# Patient Record
Sex: FEMALE | Race: BLACK OR AFRICAN AMERICAN | Employment: FULL TIME | URBAN - METROPOLITAN AREA
[De-identification: names, ages, dates, MRNs, and addresses within clinical notes are randomized per-mention and may not be internally consistent; named-entity substitution may affect disease eponyms.]

---

## 2018-09-19 ENCOUNTER — HOSPITAL ENCOUNTER (OUTPATIENT)
Dept: MAMMOGRAPHY | Age: 53
Discharge: HOME | End: 2018-09-19
Attending: PHYSICIAN ASSISTANT
Payer: COMMERCIAL

## 2018-09-19 DIAGNOSIS — Z12.39 SCREENING BREAST EXAMINATION: ICD-10-CM

## 2018-09-19 PROCEDURE — 77063 BREAST TOMOSYNTHESIS BI: CPT

## 2019-09-20 ENCOUNTER — HOSPITAL ENCOUNTER (OUTPATIENT)
Dept: MAMMOGRAPHY | Age: 54
Discharge: HOME | End: 2019-09-20
Attending: PHYSICIAN ASSISTANT
Payer: COMMERCIAL

## 2019-09-20 DIAGNOSIS — Z12.39 BREAST SCREENING, UNSPECIFIED: ICD-10-CM

## 2019-09-20 PROCEDURE — 77063 BREAST TOMOSYNTHESIS BI: CPT

## 2019-09-25 ENCOUNTER — HOSPITAL ENCOUNTER (OUTPATIENT)
Dept: MAMMOGRAPHY | Age: 54
Discharge: HOME | End: 2019-09-25
Attending: PHYSICIAN ASSISTANT
Payer: COMMERCIAL

## 2019-09-25 DIAGNOSIS — R92.8 ABNORMAL MAMMOGRAM: ICD-10-CM

## 2019-09-25 PROCEDURE — 76642 ULTRASOUND BREAST LIMITED: CPT

## 2020-10-06 ENCOUNTER — HOSPITAL ENCOUNTER (OUTPATIENT)
Dept: MAMMOGRAPHY | Age: 55
Discharge: HOME | End: 2020-10-06
Attending: FAMILY MEDICINE
Payer: COMMERCIAL

## 2020-10-06 DIAGNOSIS — Z12.31 VISIT FOR SCREENING MAMMOGRAM: ICD-10-CM

## 2020-10-06 PROCEDURE — 77063 BREAST TOMOSYNTHESIS BI: CPT

## 2021-06-24 ENCOUNTER — TRANSCRIBE ORDER (OUTPATIENT)
Dept: SCHEDULING | Age: 56
End: 2021-06-24

## 2021-06-24 DIAGNOSIS — R11.0 NAUSEA: ICD-10-CM

## 2021-06-24 DIAGNOSIS — R10.13 EPIGASTRIC PAIN: Primary | ICD-10-CM

## 2021-06-25 ENCOUNTER — HOSPITAL ENCOUNTER (OUTPATIENT)
Dept: ULTRASOUND IMAGING | Age: 56
Discharge: HOME | End: 2021-06-25
Attending: PHYSICIAN ASSISTANT
Payer: COMMERCIAL

## 2021-06-25 DIAGNOSIS — R11.0 NAUSEA: ICD-10-CM

## 2021-06-25 DIAGNOSIS — R10.13 EPIGASTRIC PAIN: ICD-10-CM

## 2021-06-25 PROCEDURE — 76700 US EXAM ABDOM COMPLETE: CPT

## 2021-09-24 ENCOUNTER — TRANSCRIBE ORDER (OUTPATIENT)
Dept: SCHEDULING | Age: 56
End: 2021-09-24

## 2021-09-24 DIAGNOSIS — Z01.419 PAP SMEAR, LOW-RISK: Primary | ICD-10-CM

## 2021-10-29 ENCOUNTER — HOSPITAL ENCOUNTER (OUTPATIENT)
Dept: MAMMOGRAPHY | Age: 56
Discharge: HOME | End: 2021-10-29
Attending: PHYSICIAN ASSISTANT
Payer: COMMERCIAL

## 2021-10-29 DIAGNOSIS — Z01.419 PAP SMEAR, LOW-RISK: ICD-10-CM

## 2021-10-29 PROCEDURE — 77067 SCR MAMMO BI INCL CAD: CPT

## 2022-10-06 ENCOUNTER — TRANSCRIBE ORDER (OUTPATIENT)
Dept: SCHEDULING | Age: 57
End: 2022-10-06

## 2022-10-06 DIAGNOSIS — Z12.31 VISIT FOR SCREENING MAMMOGRAM: Primary | ICD-10-CM

## 2022-11-01 ENCOUNTER — HOSPITAL ENCOUNTER (OUTPATIENT)
Dept: MAMMOGRAPHY | Age: 57
Discharge: HOME | End: 2022-11-01
Attending: PHYSICIAN ASSISTANT
Payer: COMMERCIAL

## 2022-11-01 DIAGNOSIS — Z12.31 VISIT FOR SCREENING MAMMOGRAM: ICD-10-CM

## 2022-11-01 PROCEDURE — 77067 SCR MAMMO BI INCL CAD: CPT

## 2022-11-01 PROCEDURE — 77063 BREAST TOMOSYNTHESIS BI: CPT

## 2023-08-29 ENCOUNTER — HOSPITAL ENCOUNTER (OUTPATIENT)
Facility: HOSPITAL | Age: 58
Setting detail: RECURRING SERIES
Discharge: HOME | End: 2023-09-01
Payer: COMMERCIAL

## 2023-08-29 PROCEDURE — G0283 ELEC STIM OTHER THAN WOUND: HCPCS

## 2023-08-29 PROCEDURE — 97161 PT EVAL LOW COMPLEX 20 MIN: CPT

## 2023-08-29 PROCEDURE — 97110 THERAPEUTIC EXERCISES: CPT

## 2023-08-29 PROCEDURE — 97140 MANUAL THERAPY 1/> REGIONS: CPT

## 2023-08-29 NOTE — THERAPY EVALUATION
pre-treatment girth :    post-treatment girth :    measured at (landmark       location) : If using vaso (only need to measure limb vaso being performed on)        min []  Other:        Skin assessment post-treatment (if applicable):    [x]  intact    []  redness- no adverse reaction                 []redness - adverse reaction:          [x]  Patient Education billed concurrently with other procedures   [x] Review HEP    [] Progressed/Changed HEP, detail:    [] Other detail:         Pain Level at end of session (0-10 scale): 2    Plan of Care / Statement of Necessity for Physical Therapy Services     Assessment / key information:  Patient is a pleasant 66year old female presenting with LBP, sx suggestive of muscle spasms. Current symptoms limit functional ability to bed forward when performing household chores or job duties, sleep through the night, or reach overhead. Marked deficits include lumbar AROM rotation, extension and sidebending restriction, LE weakness, and increased muscle turgor through the glutes and rhomboids. Patient will benefit from skilled PT to address all previously listed deficits.         Evaluation Complexity:  History:  MEDIUM  Complexity : 1-2 comorbidities / personal factors will impact the outcome/ POC ; Examination:  MEDIUM Complexity : 3 Standardized tests and measures addressin body structure, function, activity limitation and / or participation in recreation  ;Presentation:  LOW Complexity : Stable, uncomplicated  ;Clinical Decision Making:  MEDIUM Complexity : FOTO score of 26-74 Overall Complexity Rating: LOW   Problem List: pain affecting function, decrease ROM, decrease strength, impaired gait/balance, decrease ADL/functional abilities, decrease activity tolerance, and decrease flexibility/joint mobility   Treatment Plan may include any combination of the followin Therapeutic Exercise, 44140 Neuromuscular Re-Education, 92104 Manual Therapy, 62869 Therapeutic

## 2023-09-14 ENCOUNTER — HOSPITAL ENCOUNTER (OUTPATIENT)
Facility: HOSPITAL | Age: 58
Setting detail: RECURRING SERIES
Discharge: HOME | End: 2023-09-17
Payer: COMMERCIAL

## 2023-09-14 PROCEDURE — G0283 ELEC STIM OTHER THAN WOUND: HCPCS

## 2023-09-14 PROCEDURE — 97110 THERAPEUTIC EXERCISES: CPT

## 2023-09-14 NOTE — PROGRESS NOTES
10/28/2023  [x]  Upgrade activities as tolerated  []  Discharge due to :  []  Other:      Kim Martini, MAJO       9/14/2023       1:42 PM

## 2023-09-20 ENCOUNTER — HOSPITAL ENCOUNTER (OUTPATIENT)
Facility: HOSPITAL | Age: 58
Setting detail: RECURRING SERIES
Discharge: HOME | End: 2023-09-23
Payer: COMMERCIAL

## 2023-09-20 PROCEDURE — G0283 ELEC STIM OTHER THAN WOUND: HCPCS

## 2023-09-20 PROCEDURE — 97110 THERAPEUTIC EXERCISES: CPT

## 2023-09-20 PROCEDURE — 97140 MANUAL THERAPY 1/> REGIONS: CPT

## 2023-09-20 NOTE — PROGRESS NOTES
PHYSICAL THERAPY - DAILY TREATMENT NOTE (updated 3/23)      Date: 2023          Patient Name:  Antionette Varghese :  1965   Medical   Diagnosis:  Other low back pain [M54.59] Treatment Diagnosis:  M54.59  OTHER LOWER BACK PAIN    Referral Source:  Farhat Shock, * Insurance:   Payor: [employer] / Plan: Steve Rahman / Product Type: *No Product type* /                     Patient  verified yes     Visit #   Current  / Total 3 6   Time   In / Out 2:30 pm 3:25 pm   Total Treatment Time 55   Total Timed Codes 40         SUBJECTIVE    Pain Level (0-10 scale): 2-3    Any medication changes, allergies to medications, adverse drug reactions, diagnosis change, or new procedure performed?: [x] No    [] Yes (see summary sheet for update)  Medications: Verified on Patient Summary List    Subjective functional status/changes:     Pt reports she had to do a lot of lifting today at work again today and that she is continues to feel sore/tight following a full shift at work. OBJECTIVE      Therapeutic Procedures: Tx Min Billable or 1:1 Min (if diff from Tx Min) Procedure, Rationale, Specifics   15  24709 Manual Therapy (timed):  decrease pain, increase tissue extensibility, and decrease trigger points to improve patient's ability to progress to PLOF and address remaining functional goals. The manual therapy interventions were performed at a separate and distinct time from the therapeutic activities interventions . (see flow sheet as applicable)     Details if applicable:  MFR/STM to B Rhomboids and thoracic paraspinals   25   85299 Therapeutic Exercise (timed):  increase ROM, strength, coordination, balance, and proprioception to improve patient's ability to progress to PLOF and address remaining functional goals.  (see flow sheet as applicable)    Details if applicable:           Details if applicable:           Details if applicable:            Details if applicable:     40     Total Total

## 2023-09-27 ENCOUNTER — HOSPITAL ENCOUNTER (OUTPATIENT)
Facility: HOSPITAL | Age: 58
Setting detail: RECURRING SERIES
Discharge: HOME | End: 2023-09-30
Payer: COMMERCIAL

## 2023-09-27 PROCEDURE — G0283 ELEC STIM OTHER THAN WOUND: HCPCS

## 2023-09-27 PROCEDURE — 97110 THERAPEUTIC EXERCISES: CPT

## 2023-09-27 NOTE — PROGRESS NOTES
PHYSICAL THERAPY - DAILY TREATMENT NOTE (updated 3/23)      Date: 2023          Patient Name:  Cresencio Meza :  1965   Medical   Diagnosis:  Other low back pain [M54.59] Treatment Diagnosis:  M54.59  OTHER LOWER BACK PAIN    Referral Source:  Chino Israel, * Insurance:   Payor: Kimi Looney / Plan: Bala Guerrero / Product Type: *No Product type* /                     Patient  verified yes     Visit #   Current  / Total 4 6   Time   In / Out 3:30 pm 4:30 p   Total Treatment Time 60   Total Timed Codes 45         SUBJECTIVE    Pain Level (0-10 scale): 2    Any medication changes, allergies to medications, adverse drug reactions, diagnosis change, or new procedure performed?: [x] No    [] Yes (see summary sheet for update)  Medications: Verified on Patient Summary List    Subjective functional status/changes: \"It feels like I worked. today\"  She reports continued baseline tightness between her shoulder blades and in her low back/hips. OBJECTIVE      Therapeutic Procedures: Tx Min Billable or 1:1 Min (if diff from Tx Min) Procedure, Rationale, Specifics   0  11566 Manual Therapy (timed):  decrease pain, increase tissue extensibility, and decrease trigger points to improve patient's ability to progress to PLOF and address remaining functional goals. The manual therapy interventions were performed at a separate and distinct time from the therapeutic activities interventions . (see flow sheet as applicable)     Details if applicable:  MFR/STM to B Rhomboids and thoracic paraspinals   45   86075 Therapeutic Exercise (timed):  increase ROM, strength, coordination, balance, and proprioception to improve patient's ability to progress to PLOF and address remaining functional goals.  (see flow sheet as applicable)    Details if applicable:           Details if applicable:           Details if applicable:            Details if applicable:     45     Total Total         Modalities

## 2023-10-04 ENCOUNTER — HOSPITAL ENCOUNTER (OUTPATIENT)
Facility: HOSPITAL | Age: 58
Setting detail: RECURRING SERIES
Discharge: HOME | End: 2023-10-07
Payer: COMMERCIAL

## 2023-10-04 PROCEDURE — G0283 ELEC STIM OTHER THAN WOUND: HCPCS

## 2023-10-04 PROCEDURE — 97110 THERAPEUTIC EXERCISES: CPT

## 2023-10-04 NOTE — PROGRESS NOTES
Physical Therapy at [facility],   a part of [address] [place] [address] [address]  Phone: [phone]  Fax: [phone]  PHYSICAL THERAPY PROGRESS NOTE  Patient Name:  Tyrone Vaca :  1965   Treatment/Medical Diagnosis: Other low back pain [M54.59]   Referral Source:  Olga Grace, *     Date of Initial Visit:  23 Attended Visits:  5 Missed Visits:  0     SUMMARY OF TREATMENT/ASSESSMENT:  Therapeutic exercise, lifting  instruction, manual therapy     CURRENT STATUS  At time of reassessment, patient has met 2/3 short term goals and 1/3 long term goals. She has improved her FOTO outcome measure for lumbar function from 63% at initial evaluation to 70% today, indicating a significant improvement in function. She reports good relief from sx when performing the exercises but does not report improvements in her pain at work. She will do well with continued skilled services 1-2x/week for 6-8 weeks to achieve outstanding goals and improve tolerance for work duties. Short Term Goals: To be accomplished in 4 weeks. Patient will be independent with initial HEP in order to transition to general wellness program. MET  2. Patient will demonstrate lumbar AROM rotation to the R without pain to ease turning when driving. MET  3. Patient will report worst pain level of 6/10 or better to increase QOL and tolerance for sleeping through the night. Progressing toward         Long Term Goals: To be accomplished in 12 weeks. 1.Patient will report worst pain no greater than 2/10 to increase QOL and allow for independence with all hygienic self-care and ADL skills   2. Patient will demonstrate 4+/5 BLE strength to allow for home cleaning skills independently and without rest breaks needed  3. Patient will demonstrate pain free lumbar AROM WFL to improve ease with household chores like emptying the 
4-                      Knee extension                       5          5  Knee flexion                            4          4  Dorsiflexion                             5          5  Plantarflexion                          5          5  Hip ER                                     4          4   Hip abduction                          4-         4-  Hip extension                          4-         4-, pain in thoracic paraspinals      Objective/Functional Outcome Measure: lumbar  FOTO Score: 70%    Pain Level at end of session (0-10 scale): 0-1      Assessment   See progress note. Patient will continue to benefit from skilled PT / OT services to modify and progress therapeutic interventions, analyze and address functional mobility deficits, analyze and address ROM deficits, analyze and address strength deficits, analyze and address soft tissue restrictions, analyze and cue for proper movement patterns, and analyze and modify for postural abnormalities to address functional deficits and attain remaining goals. Progress toward goals / Updated goals:  []  See Progress Note/Recertification    Short Term Goals: To be accomplished in 4 weeks. Patient will be independent with initial HEP in order to transition to general wellness program. MET  Patient will demonstrate lumbar AROM rotation to the R without pain to ease turning when driving. MET  Patient will report worst pain level of 6/10 or better to increase QOL and tolerance for sleeping through the night. Progressing toward         Long Term Goals: To be accomplished in 12 weeks. 1.Patient will report worst pain no greater than 2/10 to increase QOL and allow for independence with all hygienic self-care and ADL skills   2. Patient will demonstrate 4+/5 BLE strength to allow for home cleaning skills independently and without rest breaks needed  3. Patient will demonstrate pain free lumbar AROM WFL to improve ease with household chores like emptying the

## 2023-10-11 ENCOUNTER — HOSPITAL ENCOUNTER (OUTPATIENT)
Facility: HOSPITAL | Age: 58
Setting detail: RECURRING SERIES
Discharge: HOME | End: 2023-10-14
Payer: COMMERCIAL

## 2023-10-11 PROCEDURE — 97110 THERAPEUTIC EXERCISES: CPT

## 2023-10-11 PROCEDURE — 97140 MANUAL THERAPY 1/> REGIONS: CPT

## 2023-10-11 NOTE — PROGRESS NOTES
PHYSICAL THERAPY - DAILY TREATMENT NOTE (updated 3/23)      Date: 10/11/2023          Patient Name:  Maame Rojas :  1965   Medical   Diagnosis:  Other low back pain [M54.59] Treatment Diagnosis:  M54.59  OTHER LOWER BACK PAIN    Referral Source:  Byrona Backers, * Insurance:   Payor: Deepa Best / Plan: Traci Paling / Product Type: *No Product type* /                     Patient  verified yes     Visit #   Current  / Total 6 13   Time   In / Out 3:35 pm 4:25 p   Total Treatment Time 50   Total Timed Codes 40      Total 1 on 1 time   30        SUBJECTIVE    Pain Level (0-10 scale): 5    Any medication changes, allergies to medications, adverse drug reactions, diagnosis change, or new procedure performed?: [x] No    [] Yes (see summary sheet for update)  Medications: Verified on Patient Summary List    Subjective functional status/changes:     Patient reports that yesterday she was lifting something she shouldn't have and felt intensification of the pain in her upper back. OBJECTIVE    Therapeutic Procedures: Tx Min Billable or 1:1 Min (if diff from Tx Min) Procedure, Rationale, Specifics   20  88562 Manual Therapy (timed):  decrease pain, increase tissue extensibility, and decrease trigger points to improve patient's ability to progress to PLOF and address remaining functional goals. The manual therapy interventions were performed at a separate and distinct time from the therapeutic activities interventions . (see flow sheet as applicable)     Details if applicable:  MFR/STM to B Rhomboids and thoracic paraspinals   20   35081 Therapeutic Exercise (timed):  increase ROM, strength, coordination, balance, and proprioception to improve patient's ability to progress to PLOF and address remaining functional goals.  (see flow sheet as applicable)    Details if applicable:           Details if applicable:           Details if applicable:            Details if applicable:     40

## 2023-10-18 ENCOUNTER — HOSPITAL ENCOUNTER (OUTPATIENT)
Facility: HOSPITAL | Age: 58
Setting detail: RECURRING SERIES
Discharge: HOME | End: 2023-10-21
Payer: COMMERCIAL

## 2023-10-18 PROCEDURE — 97110 THERAPEUTIC EXERCISES: CPT

## 2023-10-18 NOTE — PROGRESS NOTES
PHYSICAL THERAPY - DAILY TREATMENT NOTE (updated 3/23)      Date: 10/18/2023          Patient Name:  Gilberto Delgado :  1965   Medical   Diagnosis:  Other low back pain [M54.59] Treatment Diagnosis:  M54.59  OTHER LOWER BACK PAIN    Referral Source:  Carrie Garcia, * Insurance:   Payor: Jillian Mis / Plan: Jeffrey Melvin / Product Type: *No Product type* /                     Patient  verified yes     Visit #   Current  / Total 7 13   Time   In / Out 3:35 pm 4:25 p   Total Treatment Time 50   Total Timed Codes 40      Total 1 on 1 time   40        SUBJECTIVE    Pain Level (0-10 scale): 0    Any medication changes, allergies to medications, adverse drug reactions, diagnosis change, or new procedure performed?: [x] No    [] Yes (see summary sheet for update)  Medications: Verified on Patient Summary List    Subjective functional status/changes:     Patient reports that she was out Friday and Monday and has been following her work restrictions since then. She has no pain today. OBJECTIVE    Therapeutic Procedures: Tx Min Billable or 1:1 Min (if diff from Tx Min) Procedure, Rationale, Specifics   0  81884 Manual Therapy (timed):  decrease pain, increase tissue extensibility, and decrease trigger points to improve patient's ability to progress to PLOF and address remaining functional goals. The manual therapy interventions were performed at a separate and distinct time from the therapeutic activities interventions . (see flow sheet as applicable)     Details if applicable:  MFR/STM to B Rhomboids and thoracic paraspinals   40   76471 Therapeutic Exercise (timed):  increase ROM, strength, coordination, balance, and proprioception to improve patient's ability to progress to PLOF and address remaining functional goals.  (see flow sheet as applicable)    Details if applicable:           Details if applicable:           Details if applicable:            Details if applicable:     40

## 2023-10-25 ENCOUNTER — HOSPITAL ENCOUNTER (OUTPATIENT)
Facility: HOSPITAL | Age: 58
Setting detail: RECURRING SERIES
Discharge: HOME | End: 2023-10-28
Payer: COMMERCIAL

## 2023-10-25 PROCEDURE — 97110 THERAPEUTIC EXERCISES: CPT

## 2023-10-25 NOTE — PROGRESS NOTES
movement patterns, and analyze and modify for postural abnormalities to address functional deficits and attain remaining goals. Progress toward goals / Updated goals:  []  See Progress Note/Re-certification        Short Term Goals: To be accomplished in 4 weeks. Patient will be independent with initial HEP in order to transition to general wellness program. MET  Patient will demonstrate lumbar AROM rotation to the R without pain to ease turning when driving. MET  Patient will report worst pain level of 6/10 or better to increase QOL and tolerance for sleeping through the night. Progressing toward         Long Term Goals: To be accomplished in 12 weeks. 1.Patient will report worst pain no greater than 2/10 to increase QOL and allow for independence with all hygienic self-care and ADL skills   2. Patient will demonstrate 4+/5 BLE strength to allow for home cleaning skills independently and without rest breaks needed  3. Patient will demonstrate pain free lumbar AROM WFL to improve ease with household chores like emptying the  MET      PLAN  Yes  Continue plan of care  Re-Cert Due: 67/51/3365  [x]  Upgrade activities as tolerated  []  Discharge due to :  []  Other:      Nicole Jaquez, PT, DPT       10/25/2023       3:36 PM

## 2023-11-01 ENCOUNTER — HOSPITAL ENCOUNTER (OUTPATIENT)
Facility: HOSPITAL | Age: 58
Setting detail: RECURRING SERIES
Discharge: HOME | End: 2023-11-04
Payer: COMMERCIAL

## 2023-11-01 PROCEDURE — 97110 THERAPEUTIC EXERCISES: CPT

## 2023-11-01 PROCEDURE — 97530 THERAPEUTIC ACTIVITIES: CPT

## 2023-11-01 NOTE — PROGRESS NOTES
l  PHYSICAL THERAPY - DAILY TREATMENT NOTE (updated 3/23)      Date: 2023          Patient Name:  Larisa Faye :  1965   Medical   Diagnosis:  Other low back pain [M54.59] Treatment Diagnosis:  M54.59  OTHER LOWER BACK PAIN    Referral Source:  Maurice Navarro, * Insurance:   Payor: Ace Quintero / Plan: Valentino Hamming / Product Type: *No Product type* /                     Patient  verified yes     Visit #   Current  / Total 9 13   Time   In / Out 8:30 a 9:10 a   Total Treatment Time 40   Total Timed Codes 40     Total 1 on 1 time   40       SUBJECTIVE    Pain Level (0-10 scale): 0    Any medication changes, allergies to medications, adverse drug reactions, diagnosis change, or new procedure performed?: [x] No    [] Yes (see summary sheet for update)  Medications: Verified on Patient Summary List    Subjective functional status/changes:     Patient reports that she was a bit sore after last session but continues to follow her work restrictions and has no pain. OBJECTIVE    Therapeutic Procedures: Tx Min Billable or 1:1 Min (if diff from Tx Min) Procedure, Rationale, Specifics   0  88449 Manual Therapy (timed):  decrease pain, increase tissue extensibility, and decrease trigger points to improve patient's ability to progress to PLOF and address remaining functional goals. The manual therapy interventions were performed at a separate and distinct time from the therapeutic activities interventions . (see flow sheet as applicable)     Details if applicable:  MFR/STM to B Rhomboids and thoracic paraspinals   25   46991 Therapeutic Exercise (timed):  increase ROM, strength, coordination, balance, and proprioception to improve patient's ability to progress to PLOF and address remaining functional goals.  (see flow sheet as applicable)    Details if applicable:     15    21271 Therapeutic Activity (timed):  use of dynamic activities replicating functional movements to increase ROM,

## 2023-11-08 ENCOUNTER — HOSPITAL ENCOUNTER (OUTPATIENT)
Facility: HOSPITAL | Age: 58
Setting detail: RECURRING SERIES
Discharge: HOME | End: 2023-11-11
Payer: COMMERCIAL

## 2023-11-08 PROCEDURE — 97110 THERAPEUTIC EXERCISES: CPT

## 2023-11-08 PROCEDURE — 97530 THERAPEUTIC ACTIVITIES: CPT

## 2023-11-08 NOTE — PROGRESS NOTES
l  PHYSICAL THERAPY - DAILY TREATMENT NOTE (updated 3/23)      Date: 2023          Patient Name:  Eusebio Gunderson :  1965   Medical   Diagnosis:  Other low back pain [M54.59] Treatment Diagnosis:  M54.59  OTHER LOWER BACK PAIN    Referral Source:  Blaine Neal, * Insurance:   Payor: Brianna Echols / Plan: Ciara Mancia / Product Type: *No Product type* /                     Patient  verified yes     Visit #   Current  / Total 10 13   Time   In / Out 1:45 p 2:23 p   Total Treatment Time 38   Total Timed Codes 38     Total 1 on 1 time   38       SUBJECTIVE    Pain Level (0-10 scale): 0    Any medication changes, allergies to medications, adverse drug reactions, diagnosis change, or new procedure performed?: [x] No    [] Yes (see summary sheet for update)  Medications: Verified on Patient Summary List    Subjective functional status/changes:     Patient reports that she was a bit sore after last session but continues to follow her work restrictions and has no pain. OBJECTIVE    Therapeutic Procedures: Tx Min Billable or 1:1 Min (if diff from Tx Min) Procedure, Rationale, Specifics   0  00799 Manual Therapy (timed):  decrease pain, increase tissue extensibility, and decrease trigger points to improve patient's ability to progress to PLOF and address remaining functional goals. The manual therapy interventions were performed at a separate and distinct time from the therapeutic activities interventions . (see flow sheet as applicable)     Details if applicable:  MFR/STM to B Rhomboids and thoracic paraspinals   23   17896 Therapeutic Exercise (timed):  increase ROM, strength, coordination, balance, and proprioception to improve patient's ability to progress to PLOF and address remaining functional goals.  (see flow sheet as applicable)    Details if applicable:     15    93048 Therapeutic Activity (timed):  use of dynamic activities replicating functional movements to increase ROM,

## 2023-11-15 ENCOUNTER — APPOINTMENT (OUTPATIENT)
Facility: HOSPITAL | Age: 58
End: 2023-11-15
Payer: COMMERCIAL

## 2023-11-22 ENCOUNTER — APPOINTMENT (OUTPATIENT)
Facility: HOSPITAL | Age: 58
End: 2023-11-22
Payer: COMMERCIAL

## 2024-04-09 ENCOUNTER — TELEPHONE (OUTPATIENT)
Age: 59
End: 2024-04-09

## 2024-04-09 NOTE — TELEPHONE ENCOUNTER
Returned a missed phone call from the patient who is requesting an appt and LVM asking for a return phone call to get her scheduled.

## 2024-05-09 ENCOUNTER — HOSPITAL ENCOUNTER (OUTPATIENT)
Facility: HOSPITAL | Age: 59
Setting detail: RECURRING SERIES
Discharge: HOME | End: 2024-05-12
Attending: ORTHOPAEDIC SURGERY
Payer: COMMERCIAL

## 2024-05-09 PROCEDURE — 97161 PT EVAL LOW COMPLEX 20 MIN: CPT

## 2024-05-09 PROCEDURE — 97110 THERAPEUTIC EXERCISES: CPT

## 2024-05-09 PROCEDURE — 97016 VASOPNEUMATIC DEVICE THERAPY: CPT

## 2024-05-09 NOTE — THERAPY EVALUATION
Physical Therapy at [place],   a part of [facility]  [address] [place] [address], [address]  [place] [address]  Phone: [phone]  Fax: [phone]       PHYSICAL THERAPY - EVALUATION/PLAN OF CARE NOTE (updated 3/23)      Date: 2024          Patient Name:  Natividad Swift :  1965   Medical   Diagnosis:  Impingement syndrome of left shoulder [M75.42]  Arthritis of left acromioclavicular joint [M19.012]  Biceps tendinopathy, left [M67.922] Treatment Diagnosis:  M25.512  LEFT SHOULDER PAIN    Referral Source:  Joseph Ordaz DO Provider #:  [id number]                Insurance: Payor: [employer] / Plan: [facility] / Product Type: *No Product type* /      Patient  verified yes     Visit #   Current  / Total 1 24   Time   In / Out 11:35 a 12:45 p   Total Treatment Time 70   Total Timed Codes 15         SUBJECTIVE  Pain Level (0-10 scale): Current- 3, Best- 3, Worst- 7    []constant []intermittent []improving []worsening [x]no change since onset    Any medication changes, allergies to medications, adverse drug reactions, diagnosis change, or new procedure performed?: [x] No    [] Yes (see summary sheet for update)  Medications: Verified on Patient Summary List    Subjective functional status/changes:       Start of Care: 2024  Onset Date: chronic, worsened in 2024    Current symptoms/Complaints: L shoulder pain. Chronic for several years, worsened 2024 when she was having back pain which caused her to overuse her arms.  She has seen an orthopedist, imaging performed revealed tendonitis.  She takes Diclofenac and a muscle relaxer which help her pain.  Pain is aggravated by performing overhead repetitive reaching, holding her grandson, reaching overhead when dressing, etc.  Denies paresthesia.  She also has pain in the R shoulder but not as bad, she is R handed.  Denies neck pain but endorses tight muscles.  At work she is

## 2024-05-15 ENCOUNTER — HOSPITAL ENCOUNTER (OUTPATIENT)
Facility: HOSPITAL | Age: 59
Setting detail: RECURRING SERIES
Discharge: HOME | End: 2024-05-18
Attending: ORTHOPAEDIC SURGERY
Payer: COMMERCIAL

## 2024-05-15 PROCEDURE — 97016 VASOPNEUMATIC DEVICE THERAPY: CPT

## 2024-05-15 PROCEDURE — 97110 THERAPEUTIC EXERCISES: CPT

## 2024-05-15 NOTE — PROGRESS NOTES
PHYSICAL THERAPY - DAILY TREATMENT NOTE (updated 3/23)      Date: 5/15/2024          Patient Name:  Natividad Swift :  1965   Medical   Diagnosis:  Impingement syndrome of left shoulder [M75.42]  Arthritis of left acromioclavicular joint [M19.012]  Biceps tendinopathy, left [M67.922] Treatment Diagnosis:  M25.512  LEFT SHOULDER PAIN    Referral Source:  Joseph Ordaz DO Insurance:   Payor: [facility] / Plan: [facility] [employer] / Product Type: *No Product type* /                     Patient  verified yes     Visit #   Current  / Total 2 5 (exp )   Time   In / Out 1:45 p 2:40 p   Total Treatment Time 55   Total Timed Codes 45         SUBJECTIVE    Pain Level (0-10 scale): 0    Any medication changes, allergies to medications, adverse drug reactions, diagnosis change, or new procedure performed?: [x] No    [] Yes (see summary sheet for update)  Medications: Verified on Patient Summary List    Subjective functional status/changes:     Patient reports that her shoulder is doing pretty well today, no problems with her HEP.      OBJECTIVE      Therapeutic Procedures:  Tx Min Billable or 1:1 Min (if diff from Tx Min) Procedure, Rationale, Specifics   45  95444 Therapeutic Exercise (timed):  increase ROM, strength, coordination, balance, and proprioception to improve patient's ability to progress to PLOF and address remaining functional goals. (see flow sheet as applicable)     Details if applicable:                         45     Total Total         Modalities Rationale:     decrease inflammation and decrease pain to improve patient's ability to progress to PLOF and address remaining functional goals.       min [] Estim Unattended,             type/location:       []  w/ice    []  w/heat        min [] Estim Attended,             type/location:       []  w/ice   []  w/heat         []  w/US   []  TENS insruct            min []  Mechanical Traction,        type/lbs:        []  pro      []  sup

## 2024-05-20 ENCOUNTER — HOSPITAL ENCOUNTER (OUTPATIENT)
Facility: HOSPITAL | Age: 59
Setting detail: RECURRING SERIES
Discharge: HOME | End: 2024-05-23
Attending: ORTHOPAEDIC SURGERY
Payer: COMMERCIAL

## 2024-05-20 PROCEDURE — 97016 VASOPNEUMATIC DEVICE THERAPY: CPT

## 2024-05-20 PROCEDURE — 97110 THERAPEUTIC EXERCISES: CPT

## 2024-05-20 NOTE — PROGRESS NOTES
PHYSICAL THERAPY - DAILY TREATMENT NOTE (updated 3/23)      Date: 2024          Patient Name:  Natividad Swift :  1965   Medical   Diagnosis:  Impingement syndrome of left shoulder [M75.42]  Arthritis of left acromioclavicular joint [M19.012]  Biceps tendinopathy, left [M67.922] Treatment Diagnosis:  M25.512  LEFT SHOULDER PAIN    Referral Source:  Joseph Ordaz DO Insurance:   Payor: [facility] / Plan: [employer] [employer] / Product Type: *No Product type* /                     Patient  verified yes     Visit #   Current  / Total 3 5 (exp )   Time   In / Out 8:33 a 9:30 a   Total Treatment Time 57   Total Timed Codes 42         SUBJECTIVE    Pain Level (0-10 scale): 0    Any medication changes, allergies to medications, adverse drug reactions, diagnosis change, or new procedure performed?: [x] No    [] Yes (see summary sheet for update)  Medications: Verified on Patient Summary List    Subjective functional status/changes:     Patient reports that as she has not been to work today, she has no pain this morning.  Her exercises are going well.      OBJECTIVE      Therapeutic Procedures:  Tx Min Billable or 1:1 Min (if diff from Tx Min) Procedure, Rationale, Specifics   42  37850 Therapeutic Exercise (timed):  increase ROM, strength, coordination, balance, and proprioception to improve patient's ability to progress to PLOF and address remaining functional goals. (see flow sheet as applicable)     Details if applicable:                         42     Total Total         Modalities Rationale:     decrease inflammation and decrease pain to improve patient's ability to progress to PLOF and address remaining functional goals.       min [] Estim Unattended,             type/location:       []  w/ice    []  w/heat        min [] Estim Attended,             type/location:       []  w/ice   []  w/heat         []  w/US   []  TENS insruct            min []  Mechanical Traction,        type/lbs:

## 2024-05-23 ENCOUNTER — APPOINTMENT (OUTPATIENT)
Facility: HOSPITAL | Age: 59
End: 2024-05-23
Attending: ORTHOPAEDIC SURGERY
Payer: COMMERCIAL

## 2024-05-29 ENCOUNTER — HOSPITAL ENCOUNTER (OUTPATIENT)
Facility: HOSPITAL | Age: 59
Setting detail: RECURRING SERIES
Discharge: HOME | End: 2024-06-01
Attending: ORTHOPAEDIC SURGERY
Payer: COMMERCIAL

## 2024-05-29 PROCEDURE — 97016 VASOPNEUMATIC DEVICE THERAPY: CPT

## 2024-05-29 PROCEDURE — 97110 THERAPEUTIC EXERCISES: CPT

## 2024-05-29 NOTE — PROGRESS NOTES
Physical Therapy at [place],   a part of [facility]  [address], [address]  [place], [place] [address]  Phone: [phone]  Fax: [phone]  PHYSICAL THERAPY PROGRESS NOTE  Patient Name:  Natividad Swift :  1965   Treatment/Medical Diagnosis: Impingement syndrome of left shoulder [M75.42]  Arthritis of left acromioclavicular joint [M19.012]  Biceps tendinopathy, left [M67.922]   Referral Source:  Joseph Ordaz DO     Date of Initial Visit:  24 Attended Visits:  4 Missed Visits:  0     SUMMARY OF TREATMENT/ASSESSMENT:  Therapeutic exercise, neuromuscular re-education, HEP instruction, vasopneumatic compression    CURRENT STATUS/GOALS  At time of reassessment, patient has met all short term goals and demonstrates excellent progress toward long term goals.  She has improved her cervical and B shoulder AROM as well as BUE strength but remains limited in functional internal rotation range and tolerance for overhead activity.  She will continue to benefit from skilled PT services 1-2x/week for 6-8 weeks to achieve outstanding goals.       Short Term Goals: To be accomplished in 8 treatments.  The patient will be independent with introductory HEP with no v.c. or tactile cuing by PT needed. MET  The patient will improve L shoulder ER strength of 4/5 or better to ease dressing tasks. MET  The patient will reports worst pain of 5/10 or better to increase QOL and tolerance for performing household chores. MET     Long Term Goals: To be accomplished in 24 treatments.  The patient will demonstrate pain free shoulder AROM multidirectionally to improve ease with household chores such as dishes, laundry and cleaning tasks  The patient will report ability to unload groceries from car into home without an increase in pain within the last 5 days  The patient will demonstrate a minimum of 4+/5 strength of BUE to allow for UE endurance for fulling loading and 
AROM multidirectionally to improve ease with household chores such as dishes, laundry and cleaning tasks  The patient will report ability to unload groceries from car into home without an increase in pain within the last 5 days  The patient will demonstrate a minimum of 4+/5 strength of BUE to allow for UE endurance for fulling loading and transferring clothing from washer and dryer without rest breaks and without an increase in pain  The patient will demonstrate ability to lift 2 pounds overhead with pain level of 2/10 or less to improve ease of putting away dishes in kitchen      PLAN  Yes  Continue plan of care  Re-Cert Due: NA  [x]  Upgrade activities as tolerated  []  Discharge due to:  []  Other:      Channing Hernandez, PT, DPT       5/29/2024       7:00 AM

## 2024-06-03 ENCOUNTER — APPOINTMENT (OUTPATIENT)
Facility: HOSPITAL | Age: 59
End: 2024-06-03
Attending: ORTHOPAEDIC SURGERY
Payer: COMMERCIAL

## 2024-06-05 ENCOUNTER — HOSPITAL ENCOUNTER (OUTPATIENT)
Facility: HOSPITAL | Age: 59
Setting detail: RECURRING SERIES
Discharge: HOME | End: 2024-06-08
Attending: ORTHOPAEDIC SURGERY
Payer: COMMERCIAL

## 2024-06-05 PROCEDURE — 97110 THERAPEUTIC EXERCISES: CPT

## 2024-06-05 PROCEDURE — 97016 VASOPNEUMATIC DEVICE THERAPY: CPT

## 2024-06-05 NOTE — PROGRESS NOTES
PHYSICAL THERAPY - DAILY TREATMENT NOTE (updated 3/23)      Date: 2024          Patient Name:  Natividad Swift :  1965   Medical   Diagnosis:  Impingement syndrome of left shoulder [M75.42]  Arthritis of left acromioclavicular joint [M19.012]  Biceps tendinopathy, left [M67.922] Treatment Diagnosis:  M25.512  LEFT SHOULDER PAIN    Referral Source:  Joseph Ordaz DO Insurance:   Payor: [facility] / Plan: [facility] [employer] / Product Type: *No Product type* /                     Patient  verified yes     Visit #   Current  / Total 5 10 (exp )   Time   In / Out 8:00 a 9:00 a   Total Treatment Time 60   Total Timed Codes 45         SUBJECTIVE    Pain Level (0-10 scale): 2    Any medication changes, allergies to medications, adverse drug reactions, diagnosis change, or new procedure performed?: [x] No    [] Yes (see summary sheet for update)  Medications: Verified on Patient Summary List    Subjective functional status/changes:     Patient reporting \"my shoulder is a little irritated today because I was disobedient at work\". \"I had to get something done so I pushed through it\"    OBJECTIVE      Therapeutic Procedures:  Tx Min Billable or 1:1 Min (if diff from Tx Min) Procedure, Rationale, Specifics   45  71095 Therapeutic Exercise (timed):  increase ROM, strength, coordination, balance, and proprioception to improve patient's ability to progress to PLOF and address remaining functional goals. (see flow sheet as applicable)     Details if applicable:                         45     Total Total         Modalities Rationale:     decrease inflammation and decrease pain to improve patient's ability to progress to PLOF and address remaining functional goals.       min [] Estim Unattended,             type/location:       []  w/ice    []  w/heat        min [] Estim Attended,             type/location:       []  w/ice   []  w/heat         []  w/US   []  TENS insruct            min []  Mechanical

## 2024-06-10 ENCOUNTER — HOSPITAL ENCOUNTER (OUTPATIENT)
Facility: HOSPITAL | Age: 59
Setting detail: RECURRING SERIES
Discharge: HOME | End: 2024-06-13
Attending: ORTHOPAEDIC SURGERY
Payer: COMMERCIAL

## 2024-06-10 PROCEDURE — 97110 THERAPEUTIC EXERCISES: CPT

## 2024-06-10 PROCEDURE — 97016 VASOPNEUMATIC DEVICE THERAPY: CPT

## 2024-06-10 NOTE — PROGRESS NOTES
PHYSICAL THERAPY - DAILY TREATMENT NOTE (updated 3/23)      Date: 6/10/2024          Patient Name:  Natividad Swift :  1965   Medical   Diagnosis:  Impingement syndrome of left shoulder [M75.42]  Arthritis of left acromioclavicular joint [M19.012]  Biceps tendinopathy, left [M67.922] Treatment Diagnosis:  M25.512  LEFT SHOULDER PAIN    Referral Source:  Joseph Ordaz DO Insurance:   Payor: [facility] / Plan: [facility] [employer] / Product Type: *No Product type* /                     Patient  verified yes     Visit #   Current  / Total 6 10 (exp )   Time   In / Out 8:00 a 8:55 a   Total Treatment Time 55   Total Timed Codes 40         SUBJECTIVE    Pain Level (0-10 scale): 0    Any medication changes, allergies to medications, adverse drug reactions, diagnosis change, or new procedure performed?: [x] No    [] Yes (see summary sheet for update)  Medications: Verified on Patient Summary List    Subjective functional status/changes:     Patient reports that she had a good weekend, no increased pain.  She was able to practice her new exercises and they feel like a good stretch.      OBJECTIVE      Therapeutic Procedures:  Tx Min Billable or 1:1 Min (if diff from Tx Min) Procedure, Rationale, Specifics   40  03239 Therapeutic Exercise (timed):  increase ROM, strength, coordination, balance, and proprioception to improve patient's ability to progress to PLOF and address remaining functional goals. (see flow sheet as applicable)     Details if applicable:                         40     Total Total         Modalities Rationale:     decrease inflammation and decrease pain to improve patient's ability to progress to PLOF and address remaining functional goals.       min [] Estim Unattended,             type/location:       []  w/ice    []  w/heat        min [] Estim Attended,             type/location:       []  w/ice   []  w/heat         []  w/US   []  TENS insruct            min []  Mechanical

## 2024-06-11 ENCOUNTER — TRANSCRIBE ORDERS (OUTPATIENT)
Facility: HOSPITAL | Age: 59
End: 2024-06-11

## 2024-06-11 ENCOUNTER — HOSPITAL ENCOUNTER (OUTPATIENT)
Facility: HOSPITAL | Age: 59
Discharge: HOME | End: 2024-06-14
Payer: COMMERCIAL

## 2024-06-11 DIAGNOSIS — Z12.31 OTHER SCREENING MAMMOGRAM: Primary | ICD-10-CM

## 2024-06-11 DIAGNOSIS — Z12.31 OTHER SCREENING MAMMOGRAM: ICD-10-CM

## 2024-06-11 PROCEDURE — 77063 BREAST TOMOSYNTHESIS BI: CPT

## 2024-06-13 ENCOUNTER — APPOINTMENT (OUTPATIENT)
Facility: HOSPITAL | Age: 59
End: 2024-06-13
Attending: ORTHOPAEDIC SURGERY
Payer: COMMERCIAL

## 2024-06-17 ENCOUNTER — HOSPITAL ENCOUNTER (OUTPATIENT)
Facility: HOSPITAL | Age: 59
Setting detail: RECURRING SERIES
Discharge: HOME | End: 2024-06-20
Attending: ORTHOPAEDIC SURGERY
Payer: COMMERCIAL

## 2024-06-17 PROCEDURE — 97016 VASOPNEUMATIC DEVICE THERAPY: CPT

## 2024-06-17 PROCEDURE — 97110 THERAPEUTIC EXERCISES: CPT

## 2024-06-17 NOTE — PROGRESS NOTES
PHYSICAL THERAPY - DAILY TREATMENT NOTE (updated 3/23)      Date: 2024          Patient Name:  Natividad Swift :  1965   Medical   Diagnosis:  Impingement syndrome of left shoulder [M75.42]  Arthritis of left acromioclavicular joint [M19.012]  Biceps tendinopathy, left [M67.922] Treatment Diagnosis:  M25.512  LEFT SHOULDER PAIN    Referral Source:  Joseph Ordaz DO Insurance:   Payor: [facility] / Plan: [employer] [employer] / Product Type: *No Product type* /                     Patient  verified yes     Visit #   Current  / Total 7 10 (exp )   Time   In / Out 8:00 a 8:55 a   Total Treatment Time 55   Total Timed Codes 40         SUBJECTIVE    Pain Level (0-10 scale): 0    Any medication changes, allergies to medications, adverse drug reactions, diagnosis change, or new procedure performed?: [x] No    [] Yes (see summary sheet for update)  Medications: Verified on Patient Summary List    Subjective functional status/changes:     Patient reports that her shoulder continues to pop sometimes but is not painful.  \"I let my shoulder tell me what I can't do.\"  She still avoids overhead reaching around the home and sometimes is limited in how long she can hold her grandson.       OBJECTIVE      Therapeutic Procedures:  Tx Min Billable or 1:1 Min (if diff from Tx Min) Procedure, Rationale, Specifics   40  81698 Therapeutic Exercise (timed):  increase ROM, strength, coordination, balance, and proprioception to improve patient's ability to progress to PLOF and address remaining functional goals. (see flow sheet as applicable)     Details if applicable:                         40     Total Total         Modalities Rationale:     decrease inflammation and decrease pain to improve patient's ability to progress to PLOF and address remaining functional goals.       min [] Estim Unattended,             type/location:       []  w/ice    []  w/heat        min [] Estim Attended,

## 2024-06-20 ENCOUNTER — APPOINTMENT (OUTPATIENT)
Facility: HOSPITAL | Age: 59
End: 2024-06-20
Attending: ORTHOPAEDIC SURGERY
Payer: COMMERCIAL

## 2024-06-26 ENCOUNTER — APPOINTMENT (OUTPATIENT)
Facility: HOSPITAL | Age: 59
End: 2024-06-26
Attending: ORTHOPAEDIC SURGERY
Payer: COMMERCIAL

## 2024-07-08 ENCOUNTER — HOSPITAL ENCOUNTER (OUTPATIENT)
Facility: HOSPITAL | Age: 59
Setting detail: RECURRING SERIES
Discharge: HOME | End: 2024-07-11
Attending: ORTHOPAEDIC SURGERY
Payer: COMMERCIAL

## 2024-07-08 PROCEDURE — 97016 VASOPNEUMATIC DEVICE THERAPY: CPT

## 2024-07-08 PROCEDURE — 97140 MANUAL THERAPY 1/> REGIONS: CPT

## 2024-07-08 PROCEDURE — 97110 THERAPEUTIC EXERCISES: CPT

## 2024-07-08 NOTE — PROGRESS NOTES
Physical Therapy at [place],   a part of [facility]  [address], [address]  [place] [address]  Phone: [phone]  Fax: [phone]  PHYSICAL THERAPY PROGRESS NOTE  Patient Name:  Natividad Swift :  1965   Treatment/Medical Diagnosis: Impingement syndrome of left shoulder [M75.42]  Arthritis of left acromioclavicular joint [M19.012]  Biceps tendinopathy, left [M67.922]   Referral Source:  Joseph Ordaz DO     Date of Initial Visit:  24 Attended Visits:  8 Missed Visits:  1     SUMMARY OF TREATMENT/ASSESSMENT:  Therapeutic exercise, HEP instruction, manual therapy, vasopneumatic compression    CURRENT STATUS/GOALS  At time of reassessment, patient reports recent GI illness which has caused her to stop taking all medications related to the shoulder pain. She has increased reports of popping, reduced L shoulder flexion, abduction, and functional internal rotation AROM and reduced ER strength compared to last assessment. She remains limited in reaching overhead or to the side when performeing ADL's. She will continue to benefit from skilled PT services 1-2x/week for 6 weeks to achieve outstanding goals.     Upper Extremity AROM:                                                         R                      L                                                                                    Flexion                                     160                  145, p!  Abduction                                165                  135, p!     FIR                                          T10                  T8, p!  STEVEN                                         T1                    T1     MMT:                                                  R                      L  Shoulder flexion                      5                      5  Shoulder abduction                 4                      4  Shoulder IR                             4+

## 2024-07-08 NOTE — PROGRESS NOTES
PHYSICAL THERAPY - DAILY TREATMENT NOTE (updated 3/23)      Date: 2024          Patient Name:  Natividad Swift :  1965   Medical   Diagnosis:  Impingement syndrome of left shoulder [M75.42]  Arthritis of left acromioclavicular joint [M19.012]  Biceps tendinopathy, left [M67.922] Treatment Diagnosis:  M25.512  LEFT SHOULDER PAIN    Referral Source:  Joseph Ordaz DO Insurance:   Payor: [facility] / Plan: [facility] [employer] / Product Type: *No Product type* /                     Patient  verified yes     Visit #   Current  / Total 8 10 (exp )   Time   In / Out 8:00 a 9:05 a   Total Treatment Time 65   Total Timed Codes 50         SUBJECTIVE    Pain Level (0-10 scale): 0 at rest, 3 with reaching     Any medication changes, allergies to medications, adverse drug reactions, diagnosis change, or new procedure performed?: [x] No    [] Yes (see summary sheet for update)  Medications: Verified on Patient Summary List    Subjective functional status/changes:     Patient reports that she has recently been sick with a GI illness and has not been able to take her medication as a result.  She has had more pain and popping.        OBJECTIVE      Therapeutic Procedures:  Tx Min Billable or 1:1 Min (if diff from Tx Min) Procedure, Rationale, Specifics   40  20337 Therapeutic Exercise (timed):  increase ROM, strength, coordination, balance, and proprioception to improve patient's ability to progress to PLOF and address remaining functional goals. (see flow sheet as applicable)     Details if applicable:     10  70006 Manual Therapy (timed):  decrease pain, increase ROM, and increase tissue extensibility to improve patient's ability to progress to PLOF and address remaining functional goals.  The manual therapy interventions were performed at a separate and distinct time from the therapeutic activities interventions . (see flow sheet as applicable)    Details if applicable:  posterior and inferior GH

## 2024-07-18 ENCOUNTER — APPOINTMENT (OUTPATIENT)
Facility: HOSPITAL | Age: 59
End: 2024-07-18
Attending: ORTHOPAEDIC SURGERY
Payer: COMMERCIAL

## 2024-07-24 ENCOUNTER — APPOINTMENT (OUTPATIENT)
Facility: HOSPITAL | Age: 59
End: 2024-07-24
Attending: ORTHOPAEDIC SURGERY
Payer: COMMERCIAL

## 2024-07-31 ENCOUNTER — APPOINTMENT (OUTPATIENT)
Facility: HOSPITAL | Age: 59
End: 2024-07-31
Attending: ORTHOPAEDIC SURGERY
Payer: COMMERCIAL